# Patient Record
Sex: FEMALE | Race: WHITE | URBAN - METROPOLITAN AREA
[De-identification: names, ages, dates, MRNs, and addresses within clinical notes are randomized per-mention and may not be internally consistent; named-entity substitution may affect disease eponyms.]

---

## 2017-02-02 ENCOUNTER — TELEPHONE (OUTPATIENT)
Dept: FAMILY MEDICINE | Facility: CLINIC | Age: 67
End: 2017-02-02

## 2017-02-02 NOTE — TELEPHONE ENCOUNTER
Panel Management Review      Patient has the following on her problem list: None      Composite cancer screening  Chart review shows that this patient is due/due soon for the following Mammogram and Colonoscopy  Summary:    Patient is due/failing the following:   COLONOSCOPY and MAMMOGRAM    Action needed:   Patient needs office visit for mammo and colonoscpoy.    Type of outreach:    Pt has stated she will not do these. Please stop calling.    Questions for provider review:    None                                                                                                                                    Phillip Reid [facility]       Chart routed to Care Team .

## 2017-04-04 ENCOUNTER — OFFICE VISIT (OUTPATIENT)
Dept: FAMILY MEDICINE | Facility: CLINIC | Age: 67
End: 2017-04-04
Payer: COMMERCIAL

## 2017-04-04 VITALS
RESPIRATION RATE: 16 BRPM | HEIGHT: 62 IN | TEMPERATURE: 98.8 F | WEIGHT: 149.56 LBS | SYSTOLIC BLOOD PRESSURE: 130 MMHG | DIASTOLIC BLOOD PRESSURE: 62 MMHG | HEART RATE: 98 BPM | BODY MASS INDEX: 27.52 KG/M2 | OXYGEN SATURATION: 97 %

## 2017-04-04 DIAGNOSIS — J20.9 ACUTE BRONCHITIS, UNSPECIFIED ORGANISM: Primary | ICD-10-CM

## 2017-04-04 DIAGNOSIS — Z12.11 SCREENING FOR COLON CANCER: ICD-10-CM

## 2017-04-04 PROCEDURE — 99213 OFFICE O/P EST LOW 20 MIN: CPT | Performed by: INTERNAL MEDICINE

## 2017-04-04 RX ORDER — BENZONATATE 100 MG/1
100 CAPSULE ORAL 3 TIMES DAILY PRN
Qty: 42 CAPSULE | Refills: 1 | Status: SHIPPED | OUTPATIENT
Start: 2017-04-04

## 2017-04-04 RX ORDER — GUAIFENESIN 600 MG/1
600 TABLET, EXTENDED RELEASE ORAL 2 TIMES DAILY PRN
Qty: 20 TABLET | Refills: 0 | Status: SHIPPED | OUTPATIENT
Start: 2017-04-04

## 2017-04-04 NOTE — NURSING NOTE
"Chief Complaint   Patient presents with     Cough     x 1 week       Initial /62 (BP Location: Right arm, Patient Position: Chair, Cuff Size: Adult Regular)  Pulse 98  Temp 98.8  F (37.1  C) (Tympanic)  Resp 16  Ht 5' 2\" (1.575 m)  Wt 149 lb 9 oz (67.8 kg)  SpO2 97%  Breastfeeding? No  BMI 27.36 kg/m2 Estimated body mass index is 27.36 kg/(m^2) as calculated from the following:    Height as of this encounter: 5' 2\" (1.575 m).    Weight as of this encounter: 149 lb 9 oz (67.8 kg).  Medication Reconciliation: complete     Patient would like to be notified at the following phone number for results from this visit   [phone] OK to leave message   Tamie Sanders CMA (AAMA) 4/4/2017 2:18 PM      "

## 2017-04-04 NOTE — PROGRESS NOTES
SUBJECTIVE:                                                    Jackie Willingham is a 66 year old female who presents to clinic today for the following health issues:      RESPIRATORY SYMPTOMS      Duration: x 1 week    Description  Productive Cough & Chest Congestion    Severity: moderate    Accompanying signs and symptoms: None    History (predisposing factors):  none    Precipitating or alleviating factors: None    Therapies tried and outcome:  None    Friend has been sick with similar symptoms. Has been drinking carrot juice and trying cough tea.     Has been getting some productive cough, feels more in the throat, does not feel nasal congestion or rhinorrhea. Now feels as though lower in the chest. Getting small phelgm, rakesh yellow, some clear.        Problem list and histories reviewed & adjusted, as indicated.  Additional history: as documented    Patient Active Problem List   Diagnosis     CARDIOVASCULAR SCREENING; LDL GOAL LESS THAN 160     Past Surgical History:   Procedure Laterality Date     APPENDECTOMY       HYSTERECTOMY TOTAL ABDOMINAL, BILATERAL SALPINGO-OOPHORECTOMY, COMBINED       HYSTERECTOMY, PAP NO LONGER INDICATED         Social History   Substance Use Topics     Smoking status: Never Smoker     Smokeless tobacco: Not on file     Alcohol use No     Family History   Problem Relation Age of Onset     DIABETES Mother      Hypertension Mother      Hypertension Father      Other Cancer Sister            Reviewed and updated as needed this visit by clinical staff       Reviewed and updated as needed this visit by Provider         ROS:  Constitutional, HEENT, cardiovascular, pulmonary, GI, , musculoskeletal, neuro, skin, endocrine and psych systems are negative, except as otherwise noted.    OBJECTIVE:                                                    /62 (BP Location: Right arm, Patient Position: Chair, Cuff Size: Adult Regular)  Pulse 98  Temp 98.8  F (37.1  C) (Tympanic)  Resp 16  Ht  "5' 2\" (1.575 m)  Wt 149 lb 9 oz (67.8 kg)  SpO2 97%  Breastfeeding? No  BMI 27.36 kg/m2  Body mass index is 27.36 kg/(m^2).  GENERAL: healthy, alert and no distress  EYES: Eyes grossly normal to inspection, PERRL and conjunctivae and sclerae normal  HENT: ear canals and TM's normal, nose and mouth without ulcers or lesions  NECK: no adenopathy, no asymmetry, masses, or scars and thyroid normal to palpation  RESP: occasional rhonchi, no wheezing or crackles, easy work of breathing  CV: regular rate and rhythm, normal S1 S2, no S3 or S4, no murmur, click or rub, no peripheral edema and peripheral pulses strong  ABDOMEN: soft, nontender, no hepatosplenomegaly, no masses and bowel sounds normal  MS: no gross musculoskeletal defects noted, no edema    Diagnostic Test Results:  none      ASSESSMENT/PLAN:                                                    1. Acute bronchitis, unspecified organism  Discussed progression of illness and with having some improvement and not being 10 days in duration, will hold off on antibiotics and consider this a viral infection, will do symptomatic cares but if not improving would treat with azithromycin  - benzonatate (TESSALON) 100 MG capsule; Take 1 capsule (100 mg) by mouth 3 times daily as needed for cough  Dispense: 42 capsule; Refill: 1  - guaiFENesin (MUCINEX) 600 MG 12 hr tablet; Take 1 tablet (600 mg) by mouth 2 times daily as needed for cough  Dispense: 20 tablet; Refill: 0    2. Screening for colon cancer  Discussed screening in detail, declines colonoscopy and mammogram, ok with considering the FIT testing  - Fecal colorectal cancer screen (FIT); Future      Patient Instructions   1) Start with the tessalon pearls to help with coughing    2) You can also use the mucinex to help with secretions in the chest    3) if things are not getting better by the end of this week, we can do azithromycin for bronchitis treatment as it will have been 10 days of symptoms.    Joesph Keene, " MD Joesph Keene MD, MD  [facility]

## 2017-04-04 NOTE — PATIENT INSTRUCTIONS
1) Start with the tessalon pearls to help with coughing    2) You can also use the mucinex to help with secretions in the chest    3) if things are not getting better by the end of this week, we can do azithromycin for bronchitis treatment as it will have been 10 days of symptoms.    Joesph Keene MD

## 2017-04-04 NOTE — MR AVS SNAPSHOT
"              After Visit Summary   4/4/2017    Jackie Willingham    MRN: [id number]           Patient Information     Date Of Birth          1950        Visit Information        Provider Department      4/4/2017 2:10 PM Joesph Keene MD [facility]        Today's Diagnoses     Acute bronchitis, unspecified organism    -  1      Care Instructions    1) Start with the tessalon pearls to help with coughing    2) You can also use the mucinex to help with secretions in the chest    3) if things are not getting better by the end of this week, we can do azithromycin for bronchitis treatment as it will have been 10 days of symptoms.    Joesph Keene MD        Follow-ups after your visit        Who to contact     If you have questions or need follow up information about today's clinic visit or your schedule please contact [facility] directly at [phone].  Normal or non-critical lab and imaging results will be communicated to you by MyChart, letter or phone within 4 business days after the clinic has received the results. If you do not hear from us within 7 days, please contact the clinic through MyChart or phone. If you have a critical or abnormal lab result, we will notify you by phone as soon as possible.  Submit refill requests through [employer] or call your pharmacy and they will forward the refill request to us. Please allow 3 business days for your refill to be completed.          Additional Information About Your Visit        MyChart Information     [employer] lets you send messages to your doctor, view your test results, renew your prescriptions, schedule appointments and more. To sign up, go to [email or web address] . Click on \"Log in\" on the left side of the screen, which will take you to the Welcome page. Then click on \"Sign up Now\" on the right side of the page.     You will be asked to enter the access code listed below, as well as some personal information. Please " "follow the directions to create your username and password.     Your access code is: [email or web address]  Expires: 7/3/2017  2:34 PM     Your access code will  in 90 days. If you need help or a new code, please call your [place] clinic or [phone].        Care EveryWhere ID     This is your Care EveryWhere ID. This could be used by other organizations to access your [place] medical records  [id number]        Your Vitals Were     Pulse Temperature Respirations Height Pulse Oximetry Breastfeeding?    98 98.8  F (37.1  C) (Tympanic) 16 5' 2\" (1.575 m) 97% No    BMI (Body Mass Index)                   27.36 kg/m2            Blood Pressure from Last 3 Encounters:   17 130/62   16 112/62   16 112/80    Weight from Last 3 Encounters:   17 149 lb 9 oz (67.8 kg)   16 138 lb 1.6 oz (62.6 kg)   16 130 lb 4.8 oz (59.1 kg)              Today, you had the following     No orders found for display         Today's Medication Changes          These changes are accurate as of: 17  2:34 PM.  If you have any questions, ask your nurse or doctor.               Start taking these medicines.        Dose/Directions    benzonatate 100 MG capsule   Commonly known as:  TESSALON   Used for:  Acute bronchitis, unspecified organism   Started by:  Joesph Keene MD        Dose:  100 mg   Take 1 capsule (100 mg) by mouth 3 times daily as needed for cough   Quantity:  42 capsule   Refills:  1       guaiFENesin 600 MG 12 hr tablet   Commonly known as:  MUCINEX   Used for:  Acute bronchitis, unspecified organism   Started by:  Joesph Keene MD        Dose:  600 mg   Take 1 tablet (600 mg) by mouth 2 times daily as needed for cough   Quantity:  20 tablet   Refills:  0            Where to get your medicines      These medications were sent to [facility] [address] [facility] [address] [place] [address] & [facility]  [address] [place], [place] [address] [place] [address]     Phone:  " [phone]     benzonatate 100 MG capsule    guaiFENesin 600 MG 12 hr tablet                Primary Care Provider Office Phone # Fax #    Romario Das PA-C [phone]       [facility] [address]  [facility] [address]        Thank you!     Thank you for choosing [facility]  for your care. Our goal is always to provide you with excellent care. Hearing back from our patients is one way we can continue to improve our services. Please take a few minutes to complete the written survey that you may receive in the mail after your visit with us. Thank you!             Your Updated Medication List - Protect others around you: Learn how to safely use, store and throw away your medicines at www.disposemymeds.org.          This list is accurate as of: 4/4/17  2:34 PM.  Always use your most recent med list.                   Brand Name Dispense Instructions for use    benzonatate 100 MG capsule    TESSALON    42 capsule    Take 1 capsule (100 mg) by mouth 3 times daily as needed for cough       guaiFENesin 600 MG 12 hr tablet    MUCINEX    20 tablet    Take 1 tablet (600 mg) by mouth 2 times daily as needed for cough

## 2017-04-05 ENCOUNTER — TELEPHONE (OUTPATIENT)
Dept: PEDIATRICS | Facility: CLINIC | Age: 67
End: 2017-04-05

## 2017-04-05 NOTE — TELEPHONE ENCOUNTER
REYNOLDI to provider:  Patient seen yesterday for bronchitis. She took the Tessalon Perles last night at 1840, and felt it helped her cough.  She developed dizziness this morning. She read the patient information, and it says to call your doctor if you have  Dizziness develop. She is sensitive to medications and decided not to continue taking it. Advised would notify provider.  She does not feel she needs it today. She has not yet started the Mucinex, so advised she start this to help with the  Secretions. She is not running a temperature. She is feeling fine otherwise, eating and drinking fine.   She will call back later in the week if not improving. (per note says possible antibiotic)    Radha Saenz, BRENDA  Triage Nurse

## 2017-04-08 ENCOUNTER — TELEPHONE (OUTPATIENT)
Dept: NURSING | Facility: CLINIC | Age: 67
End: 2017-04-08

## 2017-04-08 NOTE — TELEPHONE ENCOUNTER
Clinic action Needed;  YES  Reason for call:Patient would like instruction on what to do for cough since she couldn't tolerate Tessalon Perles. Please call  On Monday to discuss.     Routed to: Dr. Keene P [id number]    Violeta Guadalupe RN   [facility]

## 2017-04-08 NOTE — TELEPHONE ENCOUNTER
"Call Type: Triage Call    Presenting Problem: \" I was seen last week for a cough and I was  unable to take the Tessalon because it made me dizzy. I never heard  back if I could take Robitussin. I was going to take the Mucinex but  then opened it up and realized the pills are too big, and time  release.\" Advised to get Mucinex childrens liquid for chest  congestion. Patient states she has taken some childrens guaifenesin  syrup in the past, she states she will go get that again. Message  sent through [employer] regarding Tessalon/Robitussin.  Triage Note:  Guideline Title: Medication Questions - Adult  Recommended Disposition: Provide Health Information  Original Inclination: Wanted to speak with a nurse  Override Disposition:  Intended Action: Follow advice given  Physician Contacted: No  Caller has medication question(s) that was answered with available resources ?  YES  Pregnant and has medication questions regarding prescribed and/or nonprescribed  medication(s) not covered by available resources ? NO  Breastfeeding and has medication questions regarding prescribed and/or  nonprescribed medication(s) not covered by available resources ? NO  Requested information on how to safely dispose of  or unused medications ?  NO  Sign(s) or symptom(s) associated with a diagnosed condition or with a new illness  ? NO  Prescription ordered today and not available at pharmacy putting patient at  clinical risk ? NO  Recurrence of a symptom(s) or illness post prescribed medication treatment AND  provider instructed patient to call if symptom(s) returned. ? NO  Unable to obtain prescribed medication related to available resources AND  situation poses immediate clinical risk ? NO  Pharmacy calling to clarify prescription order. ? NO  Requests refill of prescribed medication that does NOT have a valid refill; lack  of medication may cause clinical risk to patient if not available. ? NO  Has questions about prescribed and/or " nonprescribed medications not covered by  available resources ? NO  Pharmacy calling with prescription question; answered per department policy. ? NO  Requests refill of prescribed medication without valid refills OR requests refill  of prescribed medication with valid refills but does not have prescription number  (no RX container); lack of medication does not put patient at clinical risk ? NO  Physician Instructions:  Care Advice:

## 2017-04-10 NOTE — TELEPHONE ENCOUNTER
Per last office visit note:  1) Start with the tessalon pearls to help with coughing     2) You can also use the mucinex to help with secretions in the chest     3) if things are not getting better by the end of this week, we can do azithromycin for bronchitis treatment as it will have been 10 days of symptoms.     Joesph Keene MD

## 2017-04-10 NOTE — TELEPHONE ENCOUNTER
Patient states she started taking mucinex for children 2 days ago. States she no longer has the gurgling in the chest anymore States she isn't coughing as hard. The dizziness has gone away. She is going to give it a couple more days and see how it goes. No fever.   Alesia Zarate RN

## 2017-05-19 ENCOUNTER — TELEPHONE (OUTPATIENT)
Dept: PEDIATRICS | Facility: CLINIC | Age: 67
End: 2017-05-19

## 2017-05-19 NOTE — TELEPHONE ENCOUNTER
Patient calls.  States that she was sorting coins today for a couple of hours.  She sorted about 2 gallons of coins. She developed tingling in her fingertips.  She denies any pain, there is no swelling, redness,  or rashes.  She has a full ROM, no changes from before.  The hands are the same temperature as before-little cold as always.  There are no breaks in the skin.  Noticed this about 30 minutes ago and thinks it may be a reaction to the coins.  We discussed that it could be a reaction to metal, or something that may have spill on the coins.  We discussed position she was in while sorting the coins as a possible cause as well.  She is able to  things, make a fist, and carry things around.  She washed her hand with an antibacterial soap.  She remembers having a reaction to some kind of weed while pulling them in the garden recently and the same reaction occurred-tingling in her fingertips.  It resolved after washing hands.  We talked that it is ok to take Benadryl if needed.  We discussed wearing gloves in the future.    Huddled with Dr. Keene-wash hand and take Benadryl prn. Call to patient-she is improving-just 2 fingers are still tingly, the rest is better.  Due for a few things on HM, patient advised-she is not interested in a mammogram or a colonoscopy.  She will call to schedule appointment.    Jud Wolfe RN  Message handled by Nurse Triage.

## 2017-05-26 ENCOUNTER — TELEPHONE (OUTPATIENT)
Dept: FAMILY MEDICINE | Facility: CLINIC | Age: 67
End: 2017-05-26

## 2017-05-26 NOTE — TELEPHONE ENCOUNTER
Panel Management Review      Patient has the following on her problem list: None      Composite cancer screening  Chart review shows that this patient is due/due soon for the following Mammogram and Colonoscopy  Summary:    Patient is due/failing the following:   COLONOSCOPY and MAMMOGRAM    Action needed:   Patient needs office visit for colon/mammo.    Type of outreach:    PT declined     Questions for provider review:    None                                                                                                                                    Phillip Reid [facility]       Chart routed to Care Team .